# Patient Record
Sex: MALE | Race: WHITE | URBAN - METROPOLITAN AREA
[De-identification: names, ages, dates, MRNs, and addresses within clinical notes are randomized per-mention and may not be internally consistent; named-entity substitution may affect disease eponyms.]

---

## 2022-11-17 ENCOUNTER — OFFICE VISIT (OUTPATIENT)
Dept: ORTHOPEDIC SURGERY | Age: 27
End: 2022-11-17
Payer: COMMERCIAL

## 2022-11-17 VITALS
RESPIRATION RATE: 18 BRPM | WEIGHT: 204 LBS | HEART RATE: 90 BPM | OXYGEN SATURATION: 99 % | HEIGHT: 71 IN | TEMPERATURE: 98.9 F | BODY MASS INDEX: 28.56 KG/M2

## 2022-11-17 DIAGNOSIS — M51.36 DDD (DEGENERATIVE DISC DISEASE), LUMBAR: ICD-10-CM

## 2022-11-17 DIAGNOSIS — M54.50 LOW BACK PAIN, UNSPECIFIED BACK PAIN LATERALITY, UNSPECIFIED CHRONICITY, UNSPECIFIED WHETHER SCIATICA PRESENT: Primary | ICD-10-CM

## 2022-11-17 DIAGNOSIS — R20.0 LEG NUMBNESS: ICD-10-CM

## 2022-11-17 DIAGNOSIS — M47.816 LUMBAR FACET ARTHROPATHY: ICD-10-CM

## 2022-11-17 PROCEDURE — 99203 OFFICE O/P NEW LOW 30 MIN: CPT | Performed by: PHYSICAL MEDICINE & REHABILITATION

## 2022-11-17 PROCEDURE — 72110 X-RAY EXAM L-2 SPINE 4/>VWS: CPT | Performed by: PHYSICAL MEDICINE & REHABILITATION

## 2022-11-17 NOTE — PATIENT INSTRUCTIONS
Herniated Disc: Exercises  Introduction  Here are some examples of exercises for you to try. The exercises may be suggested for a condition or for rehabilitation. Start each exercise slowly. Ease off the exercises if you start to have pain. You will be told when to start these exercises and which ones will work best for you. How to stay safe  These exercises can help you move easier and feel better. But when you first start doing them, you may have more pain in your back. This is normal. But it is important to pay close attention to your pain during and after each exercise. Keep doing these exercises if your pain stays the same or moves from your leg and buttock more toward the middle of your spine. Pain moving out of your leg and buttock is a good sign. Stop doing these exercises if your pain gets worse in your leg and buttock. Stop if you start to have pain in your leg and buttock that you didn't have before. Be sure to do these exercises in the order they appear. Note how your pain changes before you move to the next one. If your pain is much worse right after exercise and stays worse the next day, do not do any of these exercises. How to do the exercises  1. Rest on belly  Lie on your stomach, with your head turned to the side. Try to relax your lower back muscles as much as you can. Continue to lie on your stomach for 2 minutes. Keep your arms beside your body. If that position bothers your neck, place your hands, one on top of the other, underneath your forehead. This will help support your head and neck. If lying in this position causes or increases pain down your leg, stop this exercise and do not do the next exercises. 2. Press-up back extension  Lie on your stomach, with your face down and your elbows tucked into your sides and under your shoulders. Press your elbows down into the floor to raise your upper back. Hold this position for 15 to 30 seconds. Repeat 2 to 4 times.   As you do this, relax your stomach muscles and allow your back to arch without using your back muscles. Let your low back relax completely as you arch up. Don't let your hips or pelvis come off the floor. Then relax, and return to the start position. Over time, work up to staying in the press-up position for up to 2 minutes. If lying in this position causes or increases pain down your leg, stop this exercise and do not do the next exercises. 3. Full press-up back extension  Lie on your stomach with your face down, keeping your elbows tucked into your sides and under your shoulders. Straighten your elbows, and push your upper body up as far as you can. Hold this position for 5 seconds, and then relax. Repeat 10 times. Allow your lower back to sag. Keep your hips, pelvis, and legs relaxed. Each time, try to raise your upper body a little higher and hold your arms a bit straighter. If lying in this position causes or increases pain down your leg, stop this exercise and do not do the next exercises. If you can't do this exercise, you may instead try the backward bend exercise that follows. 4. Backward bend  Stand with your feet hip-width apart, and don't lock your knees. Place your hands in the small of your back. Bend backward as far as you can, keeping your knees straight. Repeat 2 to 4 times. Your toes should be pointing forward. Hold this position for 2 to 3 seconds. Then return to your starting position. Each time, try to bend backward a little farther, until you bend backward as far as you can. If standing in this position causes or increases pain down your leg, stop this exercise. Follow-up care is a key part of your treatment and safety. Be sure to make and go to all appointments, and call your doctor if you are having problems. It's also a good idea to know your test results and keep a list of the medicines you take.   Current as of: March 9, 2022               Content Version: 13.4  © 1595-8055 [employer]. Care instructions adapted under license by [employer] (which disclaims liability or warranty for this information). If you have questions about a medical condition or this instruction, always ask your healthcare professional. [address] any warranty or liability for your use of this information.

## 2022-11-17 NOTE — PROGRESS NOTES
[facility]  [address] [facility], [facility] [place]   Phone: [phone]  Fax: [phone]    NEW PATIENT  Pt's YOB: 1995    ASSESSMENT   Diagnoses and all orders for this visit:    1. Low back pain, unspecified back pain laterality, unspecified chronicity, unspecified whether sciatica present  -     AMB POC XRAY, SPINE, LUMBOSACRAL; 4+  -     REFERRAL TO PHYSICAL THERAPY    2. Lumbar facet arthropathy  -     REFERRAL TO PHYSICAL THERAPY    3. DDD (degenerative disc disease), lumbar  -     REFERRAL TO PHYSICAL THERAPY    4. Leg numbness  -     REFERRAL TO PHYSICAL THERAPY       IMPRESSION AND PLAN:  Amber Romano is a 32 y.o.  male with history of low back pain x 3-5 years with more frequency episodes in the last year. Pt presents to the office today as a new patient and complains of spasms in the lumbar region when transitioning from being hunched over to standing straight. He tries to limit his medication use. 1) Pt was given information on herniated disc exercises. 2) Discussed treatment options with the patient including physical therapy, medication evaluation, and diagnostic testing. 3) He was referred to lumbar physical therapy for strengthening, modalities, and ROM. 4) Mr. Boo Morillo has a reminder for a \"due or due soon\" health maintenance. I have asked that he contact his primary care provider, Unknown, Provider, MD, for follow-up on this health maintenance. 5)  demonstrated consistency with prescribing. 6) Pt was advised to discuss vitamin D and zinc supplementation with his PCP. Follow-up and Dispositions    Return in about 7 weeks (around 1/5/2023) for PT follow up. HISTORY OF PRESENT ILLNESS:  Amber Romano is a 32 y.o.  male with history of low back pain x 3-5 years with more frequency episodes in the last year.  Pt presents to the office today as a new patient and complains of spasms in the lumbar region when transitioning from being hunched over to standing straight. He also reports episodes of electrical tingling shooting into the right leg, less frequent than the spasms in the low back, and dismissed weakness in either of the legs. Of note, pt works in an office full time in [employer] and mentions the chair exacerbates his lumbar pain while causing pain in the hips. Pt denies loss of bowel or bladder control as well as balance issues. He also denies previous physical therapy, medication, or diagnostic imaging. Pt notes no allergies to medications, and he is not sensitive to NSAID's but does indicate he only occasionally uses them and tries to limit his medication use. Pt at this time desires to proceed with lumbar physical therapy. Pain Scale: 8/10     PCP: Unknown, Provider, MD    History reviewed. No pertinent past medical history.      Social History     Socioeconomic History    Marital status:      Spouse name: Not on file    Number of children: Not on file    Years of education: Not on file    Highest education level: Not on file   Occupational History    Not on file   Tobacco Use    Smoking status: Never    Smokeless tobacco: Never   Vaping Use    Vaping Use: Never used   Substance and Sexual Activity    Alcohol use: Not Currently    Drug use: Never    Sexual activity: Not on file   Other Topics Concern    Not on file   Social History Narrative    Not on file     Social Determinants of Health     Financial Resource Strain: Not on file   Food Insecurity: Not on file   Transportation Needs: Not on file   Physical Activity: Insufficiently Active    Days of Exercise per Week: 3 days    Minutes of Exercise per Session: 20 min   Stress: Not on file   Social Connections: Not on file   Intimate Partner Violence: Not At Risk    Fear of Current or Ex-Partner: No    Emotionally Abused: No    Physically Abused: No    Sexually Abused: No   Housing Stability: Not on file           No Known Allergies    REVIEW OF SYSTEMS    Constitutional: Negative for fever, chills, or weight change. Respiratory: Negative for cough or shortness of breath. Cardiovascular: Negative for chest pain or palpitations. Gastrointestinal: Negative for acid reflux, change in bowel habits, or constipation. Genitourinary: Negative for dysuria and flank pain. Musculoskeletal: Positive for lumbar and right leg pain. Skin: Negative for rash. Neurological: Negative for headaches, dizziness, or numbness. Endo/Heme/Allergies: Negative for increased bruising. Psychiatric/Behavioral: Negative for difficulty with sleep. As per HPI    PHYSICAL EXAMINATION  Visit Vitals  Pulse 90   Temp 98.9 °F (37.2 °C) (Temporal)   Resp 18   Ht 5' 11\" (1.803 m)   Wt 204 lb (92.5 kg)   SpO2 99%   BMI 28.45 kg/m²       Constitutional: Awake, alert, and in no acute distress. HEENT: Normocephalic. Atraumatic. Oropharynx is moist and clear. PERRL. EOMI. Sclerae are nonicteric  Cardiovascular: Regular rate and rhythm  Lungs: Clear to auscultation bilaterally  Abdomen: Soft and nontender. Bowel sounds are present  Neurological: 1+ symmetrical DTRs in the upper extremities. 1+ symmetrical DTRs in the lower extremities. Sensation to light touch is intact. Negative Garcia's sign bilaterally. Decreased reflexes on the right lower extremity. Skin: warm, dry, and intact. Musculoskeletal: Mild pain with extension, axial loading, and less with forward flexion. No pain with internal or external rotation of his hips. Negative straight leg raise bilaterally. No pain with heel or toe walking. No difficulty with the single leg stance bilaterally. No pain with side to side flexion. Negative slump test bilaterally. Negative LISA's test bilaterally. Pain with palpation in the lower lumbar region.        Biceps  Triceps Deltoids Wrist Ext Wrist Flex Hand Intrin   Right  5/5  5/5  5/5  5/5  5/5  5/5   Left  5/5  5/5  5/5  5/5  5/5  5/5      Hip Flex  Quads Hamstrings Ankle DF EHL Ankle PF   Right  5/5 5/5 5/5 5/5 5/5 5/5   Left   5/5 5/5 5/5 5/5 5/5 5/5     IMAGING:    Lumbar spine 4V x-rays from 11/17/2022 was personally reviewed with the patient and demonstrated:    Mild degenerative disc L4-5 L5-S1. Listhesis L4-5. Multilevel degenerative facet. Written by Maritza Srinivasan, as dictated by Erich Rodriguez MD.  I, Dr. Erich Rodriguez confirm that all documentation is accurate.

## 2022-12-02 ENCOUNTER — HOSPITAL ENCOUNTER (OUTPATIENT)
Dept: PHYSICAL THERAPY | Age: 27
Discharge: HOME | End: 2022-12-02
Attending: PHYSICAL MEDICINE & REHABILITATION
Payer: COMMERCIAL

## 2022-12-02 PROCEDURE — 97140 MANUAL THERAPY 1/> REGIONS: CPT

## 2022-12-02 PROCEDURE — 97110 THERAPEUTIC EXERCISES: CPT

## 2022-12-02 PROCEDURE — 97161 PT EVAL LOW COMPLEX 20 MIN: CPT

## 2022-12-02 NOTE — PROGRESS NOTES
PT DAILY TREATMENT NOTE     Patient Name: Viviana Bedolla  Date:2022  : 1995  [x]  Patient  Verified  Payor: Darcia Boxer / Plan: Brady Little [id number] PPO / Product Type: PPO /    In time:202  Out time:240  Total Treatment Time (min): 38  Visit #: 1 of 8    Medicare/BCBS Only   Total Timed Codes (min):  23 1:1 Treatment Time:  38       Treatment Area: Other low back pain [M54.59]    Physical Therapy Evaluation - Lumbar    SUBJECTIVE      Any medication changes, allergies to medications, adverse drug reactions, diagnosis change, or new procedure performed?: [x] No    [] Yes (see summary sheet for update)    Subjective functional status/changes:     PLOF: (I) Functional ADLs, (I) Self-Care ADLs, Father, Work Full-Time  Current Functional Status: Difficulty with household ADLs, Difficulty with heavy lifting   Work Hx: [employer]  Living Situation: Lives with family  Comorbidities: None  Medications: None Utilized    Subjective: Patient presents with low back pain x3-5 years with increase in frequency of acute episodes in the last year. Patient questions correlation to lifting weights but denies specific injury, trauma, nor surgery. Patient subjectively reports spasms in the lumbar region when transitioning from a flexed spine (>10 minutes) to a neutral spine. Patient additionally endorses episodes of electrical tingling which shoots into the right LE with patient denying LE weakness bilaterally. Patient does report working a primarily sedentary job with patient reporting sitting in his work chair exacerbating his symptoms. Patient reports central lumbosacral pain with back spasms along bilateral aspects of spine. Patient as well endorses intermittent N/T which patient experiences along the right medial ankle with symptoms very intermittent with these symptoms most prominent at night-time.  Patient denies pain with walking, jumping but does report symptoms with prolonged sitting on hard surface. Patient denies sleep disturbances. Pain Intensity (0-10, VAS): Current 2, Worst 8, Best 1    Patient Goals: \"Eliminate back spasms. Minimize lower back pain/discomfort. \"    IMAGING (per MD note 11/17/2022)     Lumbar spine 4V x-rays from 11/17/2022 was personally reviewed with the patient and demonstrated:     Mild degenerative disc L4-5 L5-S1. Listhesis L4-5. Multilevel degenerative facet.      OBJECTIVE EXAMINATION    Posture: Normal lordotic curve    Gait:  [x] Normal     [] Abnormal:    Lumbar Active Movements: [] N/A   [] Too acute   [] Other:  ROM % AROM Comments   Forward flexion 40-60 0% limited    Extension 20-30 0% limited No change with repeated ext x5   SB right 20-30 0% limited    SB left 20-30 0% limited    Rotation right 5-10     Rotation left 5-10       Neuro Screen [] WNL  Dermatome:Intact and symmetrical bilaterally to light touch L2-S1  Reflexes: 2+ L/R Achilles, 2+ L/R Patellar    Palpation: No midline lumbar TTP    Joint Mobility: Lumbar CPA: Hypomobile L3-L5    LE MMT    Left Right   Hip Flexion 5 5    Extension 5 5    Abduction 5 5    ER 5 5    IR 5 5   Knee Flexion 5 5    Extension 5 5   Ankle Dorsiflexion 5 5    Hallux Ext 5 5    Ankle Eversion 5 5   *Assessed in supine    Special Tests  Lumbar:  Lumbar Compression: [] Pos  [x] Neg                  Hip: Stormy Bethel:  [x] R    [x] L    [] +    [x] -     Scour:  [x] R    [x] L    [] +    [x] -     FADDIR [x] R    [x] L    [] +    [x] -          Deficits: Hamstrings 90/90: Left (moderate) / Right (moderate)    Lower Abdominal MMT: 2+/5  OBJECTIVE    15 min [x]Eval                  []Re-Eval     10 min Therapeutic Exercise:  [x] See flow sheet : Patient educated regarding completion of prescribed HEP and provided with written HEP instructions, Patient educated regarding diagnosis and PT POC   Rationale: increase ROM and increase strength to improve the patients ability to improve ease with functional ADLs    5 min Therapeutic Activity:  [x]  See flow sheet : Discussion re: position modification with washing dishes, Discussion re: seat modification with workplace ergonomics   Rationale: increase ROM and increase strength  to improve the patients ability to improve ease with household ADLs     8 min Manual Therapy:    Prone, L3-L4 Central PA Grade II-III Mobilization    The manual therapy interventions were performed at a separate and distinct time from the therapeutic activities interventions. Rationale: decrease pain, increase ROM, and increase tissue extensibility to improve ease with         With   [] TE   [] TA   [] neuro   [] other: Patient Education: [x] Review HEP    [] Progressed/Changed HEP based on:   [] positioning   [] body mechanics   [] transfers   [] heat/ice application    [] other:      Other Objective/Functional Measures: See objective above. Pain Level (0-10 scale) post treatment: 2    ASSESSMENT/Changes in Function: Patient presents with s/s consistent with lumbar functional instability with symptoms of minimal complexity with patient objectively demonstrating a normal neurological screen. Objectively with lumbar AROM WNL but with loss of segmental mobility with patient noted to demonstrate poor activation and recruitment of the anterior abdominal musculature and poorlumbopelvic kinesthetic awareness. To emphasize improvements in lumbar segmental mobility and activation and recruitment of the anterior abdominal musculature with patient with relative intolerance to prolonged sitting and prolonged spinal flexion secondary to symptoms.      Patient will continue to benefit from skilled PT services to modify and progress therapeutic interventions, address functional mobility deficits, address ROM deficits, address strength deficits, analyze and address soft tissue restrictions, analyze and cue movement patterns, analyze and modify body mechanics/ergonomics, assess and modify postural abnormalities, address imbalance/dizziness, and instruct in home and community integration to attain remaining goals. [x]  See Plan of Care  []  See progress note/recertification  []  See Discharge Summary         Progress towards goals / Updated goals:    Short Term Goals: To be accomplished in 2 weeks:  1. Patient will subjectively report full compliance with prescribed HEP. Eval: HEP provided  2. Patient will demonstrate lower abdominal MMT >/= 4/5 to improve ease with functional lifting. Eval: Lower Abdominal MMT = 2+/5  3. Patient will demonstrate ability to perform squat with box lift (box + 20 lb) x5 repetitions with correct form and without pain to improve ease with household ADLs. Eval: NT    Long Term Goals: To be accomplished in 4 weeks:  1. Patient will demonstrate a significant functional improvement as demonstrated by a score of >/= 78 on FOTO. Eval: FOTO = 72       2. Patient will subjectively report pain at worst in last week </= 2/10 to improve overall quality of life. Eval: Pain at Worst (last week) = 8/10  3. Patient will report >/= 80% improvement in symptoms to improve overall quality of life.   Eval: 0%      PLAN  [x]  Upgrade activities as tolerated     []  Continue plan of care  []  Update interventions per flow sheet       []  Discharge due to:_  []  Other:_      Kimberley Gamez, SEVERO 12/2/2022  7:41 AM    Future Appointments   Date Time Provider Felecia Gary   12/2/2022  2:00 PM Pee, [address] [facility]   1/19/2023 10:20 AM MD BOBBY Hahn AMB

## 2022-12-02 NOTE — PROGRESS NOTES
[facility]              [address] [facility]        [place], [address]  [phone] fax    Plan of Care/ Statement of Necessity for Physical Therapy Services  Patient name: Ja Costello Start of Care: 2022   Referral source: Arun Montana MD : 1995    Medical Diagnosis: Other low back pain [M54.59]  Payor: BLUE CROSS / Plan: Brady Little [id number] PPO / Product Type: PPO /  Onset Date:x3-5 years, Worsening x1 year    Treatment Diagnosis: Functional Lumbar Instability   Prior Hospitalization: see medical history Provider#: [id number]   Medications: Verified on Patient summary List    Comorbidities: None   Prior Level of Function: (I) Functional ADLs, (I) Self-Care ADLs, Father, Work Full-Time     The [address] [place] [address] and following information is based on the information from the initial evaluation. Assessment:    Patient presents with s/s consistent with lumbar functional instability with symptoms of minimal complexity with patient objectively demonstrating a normal neurological screen. Objectively with lumbar AROM WNL but with loss of segmental mobility with patient noted to demonstrate poor activation and recruitment of the anterior abdominal musculature and poorlumbopelvic kinesthetic awareness. To emphasize improvements in lumbar segmental mobility and activation and recruitment of the anterior abdominal musculature with patient with relative intolerance to prolonged sitting and prolonged spinal flexion secondary to symptoms.       Patient will continue to benefit from skilled PT services to modify and progress therapeutic interventions, address functional mobility deficits, address ROM deficits, address strength deficits, analyze and address soft tissue restrictions, analyze and cue movement patterns, analyze and modify body mechanics/ergonomics, assess and modify postural abnormalities, address imbalance/dizziness, and instruct in home and community integration to attain remaining goals.     Key Information:    Posture: Normal lordotic curve  Gait:                [x] Normal        [] Abnormal:     Lumbar Active Movements: [] N/A   [] Too acute   [] Other:  ROM % AROM Comments   Forward flexion 40-60 0% limited     Extension 20-30 0% limited No change with repeated ext x5   SB right 20-30 0% limited     SB left 20-30 0% limited     Rotation right 5-10       Rotation left 5-10          Neuro Screen [] WNL  Dermatome:Intact and symmetrical bilaterally to light touch L2-S1  Reflexes: 2+ L/R Achilles, 2+ L/R Patellar     Palpation: No midline lumbar TTP     Joint Mobility: Lumbar CPA: Hypomobile L3-L5     Special Tests  Lumbar:          Lumbar Compression:            [] Pos  [x] Neg                                                       Hip:            Deliah Lowers:              [x] R    [x] L    [] +    [x] -                           Scour:              [x] R    [x] L    [] +    [x] -                           FADDIR           [x] R    [x] L    [] +    [x] -           Deficits:     Hamstrings 90/90: Left (moderate) / Right (moderate)     Lower Abdominal MMT: 2+/5    Evaluation Complexity History LOW Complexity : Zero comorbidities / personal factors that will impact the outcome / POC; Examination LOW Complexity : 1-2 Standardized tests and measures addressing body structure, function, activity limitation and / or participation in recreation  ;Presentation LOW Complexity : Stable, uncomplicated  ;Clinical Decision Making MEDIUM Complexity : FOTO score of 26-74  Overall Complexity Rating: LOW   Problem List: pain affecting function, decrease ROM, decrease strength, decrease ADL/ functional abilitiies, decrease activity tolerance, decrease flexibility/ joint mobility, and decrease transfer abilities   Treatment Plan may include any combination of the following: Therapeutic exercise, Neuromuscular reeducation, Manual therapy, Therapeutic activity, Self care/home management, and Gait training  Patient / Family readiness to learn indicated by: asking questions, trying to perform skills, and interest  Persons(s) to be included in education: patient (P)  Barriers to Learning/Limitations: None  Patient Goal (s): Eliminate back spasms. Minimize lower back pain/discomfort  Patient Self Reported Health Status: good  Rehabilitation Potential: good    Short Term Goals: To be accomplished in 2 weeks:  1. Patient will subjectively report full compliance with prescribed HEP. Eval: HEP provided  2. Patient will demonstrate lower abdominal MMT >/= 4/5 to improve ease with functional lifting. Eval: Lower Abdominal MMT = 2+/5  3. Patient will demonstrate ability to perform squat with box lift (box + 20 lb) x5 repetitions with correct form and without pain to improve ease with household ADLs. Eval: NT    Long Term Goals: To be accomplished in 4 weeks:  1. Patient will demonstrate a significant functional improvement as demonstrated by a score of >/= 78 on FOTO. Eval: FOTO = 72       2. Patient will subjectively report pain at worst in last week </= 2/10 to improve overall quality of life. Eval: Pain at Worst (last week) = 8/10  3. Patient will report >/= 80% improvement in symptoms to improve overall quality of life. Eval: 0%    Frequency / Duration: Patient to be seen 1-2 times per week for 4 weeks. Patient/ Caregiver education and instruction: Diagnosis, prognosis, self care, activity modification, and exercises   [x]  Plan of care has been reviewed with GREGORIA Ozuna, PT 12/2/2022 7:46 AM  ________________________________________________________________________    I certify that the above Therapy Services are being furnished while the patient is under my care. I agree with the treatment plan and certify that this therapy is necessary.     Physician's Signature:____________Date:_________TIME:________     Heather Prado MD  ** Signature, Date and Time must be completed for valid certification **  Please sign and return to [facility] - [facility]              [address] [place]   [phone] fax

## 2022-12-06 ENCOUNTER — APPOINTMENT (OUTPATIENT)
Dept: PHYSICAL THERAPY | Age: 27
End: 2022-12-06
Attending: PHYSICAL MEDICINE & REHABILITATION
Payer: COMMERCIAL

## 2022-12-09 ENCOUNTER — HOSPITAL ENCOUNTER (OUTPATIENT)
Dept: PHYSICAL THERAPY | Age: 27
Discharge: HOME | End: 2022-12-09
Attending: PHYSICAL MEDICINE & REHABILITATION
Payer: COMMERCIAL

## 2022-12-09 PROCEDURE — 97110 THERAPEUTIC EXERCISES: CPT | Performed by: PHYSICAL THERAPIST

## 2022-12-09 PROCEDURE — 97140 MANUAL THERAPY 1/> REGIONS: CPT | Performed by: PHYSICAL THERAPIST

## 2022-12-09 PROCEDURE — 97112 NEUROMUSCULAR REEDUCATION: CPT | Performed by: PHYSICAL THERAPIST

## 2022-12-09 NOTE — PROGRESS NOTES
PT DAILY TREATMENT NOTE     Patient Name: Theo Carrillo  Date:2022  : 1995  [x]  Patient  Verified  Payor: Lesa Cevallos / Plan: Brady Little [id number] PPO / Product Type: PPO /    In time:8:47  Out time:9:40  Total Treatment Time (min): 53  Visit #: 2 of 8    Medicare/BCBS Only   Total Timed Codes (min):  53 1:1 Treatment Time:  43       Treatment Area: Other low back pain [M54.59]    SUBJECTIVE  Pain Level (0-10 scale): 0  Any medication changes, allergies to medications, adverse drug reactions, diagnosis change, or new procedure performed?: [x] No    [] Yes (see summary sheet for update)  Subjective functional status/changes:   [] No changes reported  Patient reports his back is feeling a little better. States he has been doing his HEP \"fairly regularly\". OBJECTIVE    23 min Therapeutic Exercise:  [] See flow sheet :   Rationale: increase ROM and increase strength to improve the patients ability to increase patients ADLs. 22 min Neuromuscular Re-education:  []  See flow sheet :   Rationale: increase strength, improve coordination, and increase proprioception  to improve the patients ability to increase core strength to improve lumbar stability. 8 min Manual Therapy:  Prone lying, CPA mobs, L/S grade III   The manual therapy interventions were performed at a separate and distinct time from the therapeutic activities interventions. Rationale: decrease pain and increase ROM to increase ease of motion to improve function. With   [] TE   [] TA   [] neuro   [] other: Patient Education: [x] Review HEP    [] Progressed/Changed HEP based on:   [] positioning   [] body mechanics   [] transfers   [] heat/ice application    [] other:      Other Objective/Functional Measures: Patient unable to maintain posture to perform TA  + box deadlift. Able to go about half way. VC needed for seated Ant pelvic tilt/post pelvic tilt.     Pain Level (0-10 scale) post treatment: 0    ASSESSMENT/Changes in Function: Patient with decreased subjective c/o pain. Patient will continue to benefit from skilled PT services to modify and progress therapeutic interventions, address functional mobility deficits, address ROM deficits, address strength deficits, analyze and address soft tissue restrictions, analyze and cue movement patterns, analyze and modify body mechanics/ergonomics, assess and modify postural abnormalities, address imbalance/dizziness, and instruct in home and community integration to attain remaining goals. []  See Plan of Care  []  See progress note/recertification  []  See Discharge Summary         Progress towards goals / Updated goals:  Short Term Goals: To be accomplished in 2 weeks:  1. Patient will subjectively report full compliance with prescribed HEP. Eval: HEP provided  Current:  Partial compliance with his HEP, does them \"fairly regularly\". 12/9/2022. Progressing. 2. Patient will demonstrate lower abdominal MMT >/= 4/5 to improve ease with functional lifting. Eval: Lower Abdominal MMT = 2+/5  3. Patient will demonstrate ability to perform squat with box lift (box + 20 lb) x5 repetitions with correct form and without pain to improve ease with household ADLs. Eval: NT     Long Term Goals: To be accomplished in 4 weeks:  1. Patient will demonstrate a significant functional improvement as demonstrated by a score of >/= 78 on FOTO. Eval: FOTO = 72       2. Patient will subjectively report pain at worst in last week </= 2/10 to improve overall quality of life. Eval: Pain at Worst (last week) = 8/10  3. Patient will report >/= 80% improvement in symptoms to improve overall quality of life.   Eval: 0%    PLAN  []  Upgrade activities as tolerated     []  Continue plan of care  []  Update interventions per flow sheet       []  Discharge due to:_  []  Other:_      Brooke Rodriguez, PT 12/9/2022  8:49 AM    Future Appointments   Date Time Provider Felecia Gary 12/16/2022 10:00 AM Kristina Mancera PT [facility]   12/23/2022 10:30 AM Ally Claudio [facility]   1/19/2023 10:20 AM MD BOBBY Crooks BS AMB

## 2022-12-16 ENCOUNTER — HOSPITAL ENCOUNTER (OUTPATIENT)
Dept: PHYSICAL THERAPY | Age: 27
Discharge: HOME | End: 2022-12-16
Attending: PHYSICAL MEDICINE & REHABILITATION
Payer: COMMERCIAL

## 2022-12-16 PROCEDURE — 97112 NEUROMUSCULAR REEDUCATION: CPT

## 2022-12-16 PROCEDURE — 97140 MANUAL THERAPY 1/> REGIONS: CPT

## 2022-12-16 NOTE — PROGRESS NOTES
PT DAILY TREATMENT NOTE     Patient Name: Rajni Arias  Date:2022  : 1995  [x]  Patient  Verified  Payor: BLUE CROSS / Plan: Brady Little [id number] PPO / Product Type: PPO /    In time:1007  Out time:1101  Total Treatment Time (min): 44  Visit #: 3 of 8    Medicare/BCBS Only   Total Timed Codes (min):  44 1:1 Treatment Time:  23       Treatment Area: Other low back pain [M54.59]    SUBJECTIVE  Pain Level (0-10 scale): 1  Any medication changes, allergies to medications, adverse drug reactions, diagnosis change, or new procedure performed?: [x] No    [] Yes (see summary sheet for update)  Subjective functional status/changes:   [] No changes reported  Patient reports that he is experiencing slight symptoms this AM with patient correlating this to prolonged sitting this AM while working. Patient reports after last treatment session being without pain x2-3 days. Patient denies any \"back spasms\" over the last week. OBJECTIVE    20 min Therapeutic Exercise:  [x] See flow sheet : Emphasis placed on improving available lumbopelvic and LE AROM and strength   Rationale: increase ROM and increase strength to improve the patients ability to improve ease with household ADLs    16 min Neuromuscular Re-education:  [x]  See flow sheet : Emphasis placed on improving activation and recruitment of the anterior abdominal and gluteal musculature and improving spinal kinesthetic awareness   Rationale: increase ROM, increase strength, improve balance, and increase proprioception  to improve the patients ability to improve ease with functional lifting. 8 min Manual Therapy:    Prone, L3-L4 Central PA Grade II-III Mobilization    The manual therapy interventions were performed at a separate and distinct time from the therapeutic activities interventions.   Rationale: decrease pain, increase ROM, and increase tissue extensibility to improve ease with     With   [] TE   [] TA   [] neuro   [] other: Patient Education: [x] Review HEP    [] Progressed/Changed HEP based on:   [] positioning   [] body mechanics   [] transfers   [] heat/ice application    [] other:      Other Objective/Functional Measures: Poor lumbopelvic awareness     Pain Level (0-10 scale) post treatment: 1-2    ASSESSMENT/Changes in Function: With continuation per plan of care with patient with positive response to treatment with overall subjective report of reduction in symptom severity and irritability. With continued poor lumbopelvic kinesthetic awareness with poor awareness of neutral spine. With limitations with exercise performance within clinic secondary to time constraints with patient requiring increased time for exercise performance with maintenance of correct form. Patient will continue to benefit from skilled PT services to modify and progress therapeutic interventions, address functional mobility deficits, address ROM deficits, address strength deficits, analyze and address soft tissue restrictions, analyze and cue movement patterns, analyze and modify body mechanics/ergonomics, assess and modify postural abnormalities, and instruct in home and community integration to attain remaining goals. []  See Plan of Care  []  See progress note/recertification  []  See Discharge Summary         Progress towards goals / Updated goals:    Short Term Goals: To be accomplished in 2 weeks:  1. Patient will subjectively report full compliance with prescribed HEP. Eval: HEP provided  Current: Progressing, Partial compliance with his HEP, does them \"fairly regularly\". 12/9/2022  2. Patient will demonstrate lower abdominal MMT >/= 4/5 to improve ease with functional lifting. Eval: Lower Abdominal MMT = 2+/5  3. Patient will demonstrate ability to perform squat with box lift (box + 20 lb) x5 repetitions with correct form and without pain to improve ease with household ADLs. Eval: NT     Long Term Goals:  To be accomplished in 4 weeks: 1. Patient will demonstrate a significant functional improvement as demonstrated by a score of >/= 78 on FOTO. Eval: FOTO = 72       2. Patient will subjectively report pain at worst in last week </= 2/10 to improve overall quality of life. Eval: Pain at Worst (last week) = 8/10  Current: Progressing, Pain at Worst (last week) = 2-3/10, 12/16/2022  3. Patient will report >/= 80% improvement in symptoms to improve overall quality of life.   Eval: 0%    PLAN  [x]  Upgrade activities as tolerated     [x]  Continue plan of care  []  Update interventions per flow sheet       []  Discharge due to:_  []  Other:_      Estephanie Augustin, PT 12/16/2022  7:35 AM    Future Appointments   Date Time Provider Felecia Gayr   12/16/2022 10:00 AM Ronni Ballwin, [place] [facility]   12/23/2022 10:30 AM Tamra Jensen PTA [facility]   1/19/2023 10:20 AM MD BOBBY Ramos BS AMB

## 2022-12-23 ENCOUNTER — HOSPITAL ENCOUNTER (OUTPATIENT)
Dept: PHYSICAL THERAPY | Age: 27
Discharge: HOME | End: 2022-12-23
Attending: PHYSICAL MEDICINE & REHABILITATION
Payer: COMMERCIAL

## 2022-12-23 PROCEDURE — 97110 THERAPEUTIC EXERCISES: CPT

## 2022-12-23 PROCEDURE — 97112 NEUROMUSCULAR REEDUCATION: CPT

## 2022-12-23 NOTE — PROGRESS NOTES
[facility] - [facility]              [address]        [place], [address] [place]   [phone] fax    Discharge Summary  Patient name: Frank Salinas Start of Care: 2022   Referral source: Yulissa Farrell MD : 1995   Medical/Treatment Diagnosis: Other low back pain [M54.59]  Payor: BLUE CROSS / Plan: Treinta Inbenta [place] 5747 PPO / Product Type: PPO /  Onset Date:x3-5 years, Worsening x1 year                  Prior Hospitalization: see medical history Provider#: [id number]   Medications: Verified on Patient Summary List    Comorbidities: None   Prior Level of Function: (I) Functional ADLs, (I) Self-Care ADLs, Father, Work Full-Time  Visits from [place] of Care: 4    Missed Visits: 0  Reporting Period : 2022 to 2022    Summary of Care:  Short Term Goals: To be accomplished in 2 weeks:  1. Patient will subjectively report full compliance with prescribed HEP. Eval: HEP provided  Current:MET: pt reports performing HEP. 2. Patient will demonstrate lower abdominal MMT >/= 4/5 to improve ease with functional lifting. Eval: Lower Abdominal MMT = 2+/5  Current: MET: abdominal MMT = 4/5   3. Patient will demonstrate ability to perform squat with box lift (box + 20 lb) x5 repetitions with correct form and without pain to improve ease with household ADLs. Eval: NT  Current: MET: patient able to perform box lift (box + 20lbs) = 5 reps. Long Term Goals: To be accomplished in 4 weeks:  1. Patient will demonstrate a significant functional improvement as demonstrated by a score of >/= 78 on FOTO. Eval: FOTO = 72   Current: MET: FOTO = 92      2. Patient will subjectively report pain at worst in last week </= 2/10 to improve overall quality of life. Eval: Pain at Worst (last week) = 8/10  Current: Progressing, Pain at Worst (last week) = 5/10,   3. Patient will report >/= 80% improvement in symptoms to improve overall quality of life.   Eval: 0%  Current: MET: pt reports 95% improvement. ASSESSMENT/RECOMMENDATIONS:    Patient reports 95% improvement with overall symptoms since start of care. Patient does not feel limited with work related tasks and is able to perform box lifts with proper form and no pain. At this time patient suitable for discharge with patient subjectively in agreement with plan of care.     [x]Discontinue therapy: [x]Patient has reached or is progressing toward set goals      []Patient is non-compliant or has abdicated      []Due to lack of appreciable progress towards set goals    Edwige Valenzuela, GREGORIA 12/23/2022 11:16 AM

## 2022-12-23 NOTE — PROGRESS NOTES
Physical Therapy Discharge Instructions      [facility] - [facility]   [address], [address] [place]   [phone] fax    Patient: Migdalia Lan  : 1995      Continue Home Exercise Program 1 time per day       Continue with    [x] Ice  as needed      [x] Heat           Follow up with MD:     [] Upon completion of therapy     [x] As needed      Recommendations:     [x]   Return to activity with home program    []   Return to activity with the following modifications:       []Post Rehab Program    []Join Independent aquatic program     []Return to/join local gym      Schuyler Aguilar PTA 2022 10:54 AM

## 2022-12-23 NOTE — PROGRESS NOTES
PT DAILY TREATMENT NOTE     Patient Name: Gustavo Dutton  Date:2022  : 1995  [x]  Patient  Verified  Payor: BLUE BEATRIS / Plan: Brady Little 5747 PPO / Product Type: PPO /    In time:10:30  Out time:11:08  Total Treatment Time (min): 38  Visit #: 4 of 8    Medicare/BCBS Only   Total Timed Codes (min):  38 1:1 Treatment Time:  38       Treatment Area: Other low back pain [M54.59]    SUBJECTIVE  Pain Level (0-10 scale): 1  Any medication changes, allergies to medications, adverse drug reactions, diagnosis change, or new procedure performed?: [x] No    [] Yes (see summary sheet for update)  Subjective functional status/changes:   [] No changes reported  Patient reports he feels he has improved with pain management and increased ease with bending. OBJECTIVE         23 min Therapeutic Exercise:  [x] See flow sheet : Emphasis placed on improving available lumbopelvic and LE AROM and strength   Rationale: increase ROM and increase strength to improve the patients ability to improve ease with household ADLs     15 min Neuromuscular Re-education:  [x]  See flow sheet : Emphasis placed on improving activation and recruitment of the anterior abdominal and gluteal musculature and improving spinal kinesthetic awareness   Rationale: increase ROM, increase strength, improve balance, and increase proprioception  to improve the patients ability to improve ease with functional lifting. With   [] TE   [] TA   [] neuro   [] other: Patient Education: [x] Review HEP    [] Progressed/Changed HEP based on:   [] positioning   [] body mechanics   [] transfers   [] heat/ice application    [] other:      Other Objective/Functional Measures: see goals      Pain Level (0-10 scale) post treatment: 0    ASSESSMENT/Changes in Function: Patient reports 95% improvement with overall symptoms.  Patient does not feel limited with work related tasks and is able to perform box lifts with proper form and no pain.          []  See Plan of Care  []  See progress note/recertification  [x]  See Discharge Summary         Progress towards goals / Updated goals:  Short Term Goals: To be accomplished in 2 weeks:  1. Patient will subjectively report full compliance with prescribed HEP. Eval: HEP provided  Current:MET: pt reports performing HEP. 12/23/22  2. Patient will demonstrate lower abdominal MMT >/= 4/5 to improve ease with functional lifting. Eval: Lower Abdominal MMT = 2+/5  Current: MET: abdominal MMT = 4/5 12/23/22  3. Patient will demonstrate ability to perform squat with box lift (box + 20 lb) x5 repetitions with correct form and without pain to improve ease with household ADLs. Eval: NT  Current: MET: patient able to perform box lift (box + 20lbs) = 5 reps. 12/23/22     Long Term Goals: To be accomplished in 4 weeks:  1. Patient will demonstrate a significant functional improvement as demonstrated by a score of >/= 78 on FOTO. Eval: FOTO = 72   Current: MET: FOTO = 92      2. Patient will subjectively report pain at worst in last week </= 2/10 to improve overall quality of life. Eval: Pain at Worst (last week) = 8/10  Current: Progressing, Pain at Worst (last week) = 5/10, 12/23/2022  3. Patient will report >/= 80% improvement in symptoms to improve overall quality of life. Eval: 0%  Current: MET: pt reports 95% improvement.  12/23/22       PLAN  []  Upgrade activities as tolerated     []  Continue plan of care  []  Update interventions per flow sheet       []  Discharge due to:_  [x]  Other:_ per patient request     Chuy Muhammad, GREGORIA 12/23/2022  10:32 AM    Future Appointments   Date Time Provider Felecia Gary   1/19/2023 10:20 AM MD BOBBY Horner BS AMB

## 2023-01-19 ENCOUNTER — OFFICE VISIT (OUTPATIENT)
Dept: ORTHOPEDIC SURGERY | Age: 28
End: 2023-01-19
Payer: COMMERCIAL

## 2023-01-19 VITALS
RESPIRATION RATE: 19 BRPM | WEIGHT: 205 LBS | HEART RATE: 79 BPM | TEMPERATURE: 98 F | HEIGHT: 70 IN | BODY MASS INDEX: 29.35 KG/M2 | OXYGEN SATURATION: 98 %

## 2023-01-19 DIAGNOSIS — R20.0 LEG NUMBNESS: ICD-10-CM

## 2023-01-19 DIAGNOSIS — M54.50 LOW BACK PAIN, UNSPECIFIED BACK PAIN LATERALITY, UNSPECIFIED CHRONICITY, UNSPECIFIED WHETHER SCIATICA PRESENT: Primary | ICD-10-CM

## 2023-01-19 NOTE — PATIENT INSTRUCTIONS
Low Back Arthritis: Exercises  Introduction  Here are some examples of typical rehabilitation exercises for your condition. Start each exercise slowly. Ease off the exercise if you start to have pain. Your doctor or physical therapist will tell you when you can start these exercises and which ones will work best for you. When you are not being active, find a comfortable position for rest. Some people are comfortable on the floor or a medium-firm bed with a small pillow under their head and another under their knees. Some people prefer to lie on their side with a pillow between their knees. Don't stay in one position for too long. Take short walks (10 to 20 minutes) every 2 to 3 hours. Avoid slopes, hills, and stairs until you feel better. Walk only distances you can manage without pain, especially leg pain. How to do the exercises  Pelvic tilt  Lie on your back with your knees bent. \"Brace\" your stomach--tighten your muscles by pulling in and imagining your belly button moving toward your spine. Press your lower back into the floor. You should feel your hips and pelvis rock back. Hold for 6 seconds while breathing smoothly. Relax and allow your pelvis and hips to rock forward. Repeat 8 to 12 times. Back stretches  Get down on your hands and knees on the floor. Relax your head and allow it to droop. Round your back up toward the ceiling until you feel a nice stretch in your upper, middle, and lower back. Hold this stretch for as long as it feels comfortable, or about 15 to 30 seconds. Return to the starting position with a flat back while you are on your hands and knees. Let your back sway by pressing your stomach toward the floor. Lift your buttocks toward the ceiling. Hold this position for 15 to 30 seconds. Repeat 2 to 4 times. Follow-up care is a key part of your treatment and safety. Be sure to make and go to all appointments, and call your doctor if you are having problems.  It's also a good idea to know your test results and keep a list of the medicines you take. Current as of: March 9, 2022               Content Version: 13.4  © 2006-2022 [employer]. Care instructions adapted under license by [employer] (which disclaims liability or warranty for this information). If you have questions about a medical condition or this instruction, always ask your healthcare professional. [place] [address] any warranty or liability for your use of this information.

## 2023-01-19 NOTE — PROGRESS NOTES
[facility]  [address] [place] [address] [place]   Phone: [phone]  Fax: [phone]    Pt's YOB: 1995    ASSESSMENT   Diagnoses and all orders for this visit:    1. Low back pain, unspecified back pain laterality, unspecified chronicity, unspecified whether sciatica present    2. Leg numbness       IMPRESSION AND PLAN:  Liborio Blank is a 32 y.o. male with history of low back pain and presents to the office today for physical therapy follow up. Pt continues to complain of pain and muscle spasms in the lumbar region and tingling shooting into the right leg, significantly improved since his last office visit. He denies taking any current over the counter medications for his pain. Elizabeth Mix 1) Pt was given information on lumbar arthritis exercises. 2) He was counseled on proper lifting mechanics. 3) Pt defers on medication at this time, as his pain is managed well at this time. 4) Lumbar spine 4V x-rays from 11/17/2022 were reviewed with the pt at today's office visit. 5) Mr. Renaldo Pérez has a reminder for a \"due or due soon\" health maintenance. I have asked that he contact his primary care provider, Not, On File, MD, for follow-up on this health maintenance. 6)  demonstrated consistency with prescribing. Follow-up and Dispositions    Return if symptoms worsen or fail to improve. HISTORY OF PRESENT ILLNESS:  Liborio Blank is a 32 y.o. male with history of low back pain and presents to the office today for physical therapy follow up. Pt continues to complain of pain and muscle spasms in the lumbar region and tingling shooting into the right leg, improved since his last office visit. He reports undergoing physical therapy for duration of 4 weeks with significant benefit, noting he is almost back to his usual self.  Pt states he has not noticed right leg pain or tingling since undergoing physical therapy and denies weakness in the legs. He confirms completing physical therapy in 12/2022 and received exercises to perform at home. Pt reports an incident after the second session of physical therapy where he was helping his dad out and felt as though his back 'locked up' but the feeling diminished and has not had a similar incident yet. 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 He denies taking any current over the counter medications for his pain. Pt at this time desires to continue with current care. Pain Scale: 1/10    PCP: Not, On File, MD     History reviewed. No pertinent past medical history. Social History     Socioeconomic History    Marital status:      Spouse name: Not on file    Number of children: Not on file    Years of education: Not on file    Highest education level: Not on file   Occupational History    Not on file   Tobacco Use    Smoking status: Never    Smokeless tobacco: Never   Vaping Use    Vaping Use: Never used   Substance and Sexual Activity    Alcohol use: Not Currently    Drug use: Never    Sexual activity: Not on file   Other Topics Concern    Not on file   Social History Narrative    Not on file     Social Determinants of Health     Financial Resource Strain: Not on file   Food Insecurity: Not on file   Transportation Needs: Not on file   Physical Activity: Insufficiently Active    Days of Exercise per Week: 3 days    Minutes of Exercise per Session: 20 min   Stress: Not on file   Social Connections: Not on file   Intimate Partner Violence: Not At Risk    Fear of Current or Ex-Partner: No    Emotionally Abused: No    Physically Abused: No    Sexually Abused: No   Housing Stability: Not on file           No Known Allergies      REVIEW OF SYSTEMS    Constitutional: Negative for fever, chills, or weight change. Respiratory: Negative for cough or shortness of breath.      Cardiovascular: Negative for chest pain or palpitations. Gastrointestinal: Negative for acid reflux, change in bowel habits, or constipation. Genitourinary: Negative for dysuria and flank pain. Musculoskeletal: Positive for lumbar pain. Skin: Negative for rash. Neurological: Negative for headaches, dizziness, or numbness. Endo/Heme/Allergies: Negative for increased bruising. Psychiatric/Behavioral: Negative for difficulty with sleep. As per HPI    PHYSICAL EXAMINATION  Visit Vitals  Pulse 79   Temp 98 °F (36.7 °C)   Resp 19   Ht 5' 10\" (1.778 m)   Wt 205 lb (93 kg)   SpO2 98%   BMI 29.41 kg/m²       Constitutional: Awake, alert, and in no acute distress. Neurological: 1+ symmetrical DTRs in the upper extremities. 1+ symmetrical DTRs in the lower extremities. Sensation to light touch is intact. Negative Garcia's sign bilaterally. Skin: warm, dry, and intact. Musculoskeletal: No pain with extension, axial loading, or forward flexion. No pain with internal or external rotation of his hips. Negative straight leg raise bilaterally. Biceps  Triceps Deltoids Wrist Ext Wrist Flex Hand Intrin   Right  5/5   5/5  5/5  5/5  5/5  5/5   Left  5/5  5/5   5/5  5/5  5/5  5/5      Hip Flex  Quads Hamstrings Ankle DF EHL Ankle PF   Right  5/5  5/5  5/5  5/5  5/5  5/5   Left  5/5  5/5  5/5   5/5  5/5  5/5     IMAGING:    Lumbar spine 4V x-rays from 11/17/2022 was personally reviewed with the patient and demonstrated:  Mild degenerative disc L4-5 L5-S1. Listhesis L4-5. Multilevel degenerative facet. Written by Carlotta Murillo, as dictated by Candido Villalta MD.  I, Dr. Candido Villalta confirm that all documentation is accurate.